# Patient Record
Sex: FEMALE | Race: WHITE | RURAL
[De-identification: names, ages, dates, MRNs, and addresses within clinical notes are randomized per-mention and may not be internally consistent; named-entity substitution may affect disease eponyms.]

---

## 2019-07-16 ENCOUNTER — OFFICE VISIT (OUTPATIENT)
Dept: INTERNAL MEDICINE CLINIC | Age: 24
End: 2019-07-16

## 2019-07-16 VITALS
SYSTOLIC BLOOD PRESSURE: 111 MMHG | RESPIRATION RATE: 16 BRPM | HEIGHT: 65 IN | WEIGHT: 109 LBS | DIASTOLIC BLOOD PRESSURE: 71 MMHG | HEART RATE: 75 BPM | BODY MASS INDEX: 18.16 KG/M2 | OXYGEN SATURATION: 97 % | TEMPERATURE: 98.2 F

## 2019-07-16 DIAGNOSIS — Z30.011 ENCOUNTER FOR INITIAL PRESCRIPTION OF CONTRACEPTIVE PILLS: ICD-10-CM

## 2019-07-16 DIAGNOSIS — J45.20 MILD INTERMITTENT ASTHMA WITHOUT COMPLICATION: Primary | ICD-10-CM

## 2019-07-16 DIAGNOSIS — Z30.09 FAMILY PLANNING: ICD-10-CM

## 2019-07-16 DIAGNOSIS — Z00.00 WELL WOMAN EXAM (NO GYNECOLOGICAL EXAM): ICD-10-CM

## 2019-07-16 DIAGNOSIS — Z23 ENCOUNTER FOR IMMUNIZATION: ICD-10-CM

## 2019-07-16 DIAGNOSIS — Z02.0 SCHOOL PHYSICAL EXAM: ICD-10-CM

## 2019-07-16 LAB
HCG URINE, QL. (POC): NEGATIVE
VALID INTERNAL CONTROL?: YES

## 2019-07-16 RX ORDER — ALBUTEROL SULFATE 90 UG/1
1 AEROSOL, METERED RESPIRATORY (INHALATION)
Qty: 1 INHALER | Refills: 2 | Status: SHIPPED | OUTPATIENT
Start: 2019-07-16 | End: 2022-01-13 | Stop reason: SDUPTHER

## 2019-07-16 RX ORDER — NORGESTIMATE AND ETHINYL ESTRADIOL 0.25-0.035
1 KIT ORAL DAILY
Qty: 1 PACKAGE | Refills: 11 | Status: SHIPPED | OUTPATIENT
Start: 2019-07-16 | End: 2020-07-01 | Stop reason: SDUPTHER

## 2019-07-16 NOTE — PROGRESS NOTES
Chief Complaint   Patient presents with   [address]     I have reviewed the patient's medical history in detail and updated the computerized patient record. Health Maintenance reviewed. Pt stated he/she does have an Advance Directive    Fall Risk Assessment, last 12 mths 7/16/2019   Able to walk? Yes   Fall in past 12 months? No       3 most recent PHQ Screens 7/16/2019   Little interest or pleasure in doing things Several days   Feeling down, depressed, irritable, or hopeless Several days   Total Score PHQ 2 2       Abuse Screening Questionnaire 7/16/2019   Do you ever feel afraid of your partner? N   Are you in a relationship with someone who physically or mentally threatens you? N   Is it safe for you to go home?  Y       ADL Assessment 7/16/2019   Feeding yourself No Help Needed   Getting from bed to chair No Help Needed   Getting dressed No Help Needed   Bathing or showering No Help Needed   Walk across the room (includes cane/walker) No Help Needed   Using the telphone No Help Needed   Taking your medications No Help Needed   Preparing meals No Help Needed   Managing money (expenses/bills) No Help Needed   Moderately strenuous housework (laundry) No Help Needed   Shopping for personal items (toiletries/medicines) No Help Needed   Shopping for groceries No Help Needed   Driving No Help Needed   Climbing a flight of stairs No Help Needed   Getting to places beyond walking distances No Help Needed

## 2019-07-16 NOTE — PATIENT INSTRUCTIONS
Learning About Birth Control What is birth control? Birth control is any method used to prevent pregnancy. Another word for birth control is contraception. If you have sex without birth control, there is a chance that you could get pregnant. This is true even if you have not started having periods yet or you are getting close to menopause. The only sure way to prevent pregnancy is to not have sex. But finding a good method of birth control that you are comfortable with can help you avoid an unplanned pregnancy. Be sure to tell your doctor about any health problems you have or medicines you take. He or she can help you choose the birth control method that is right for you. What are the types of birth control? There are many different kinds of birth control. Each has pros and cons. Learning about all the methods will help you find one that is right for you. · Long-acting reversible contraception (LARC) is the best reversible method you can use to prevent pregnancy. If you decide you want to get pregnant, you can have them removed. LARCs are implants and intrauterine devices (IUDs). While they are being used, they usually prevent pregnancy for years. ? Implants are placed under the skin of the arm. They release the hormone progestin and prevent pregnancy for about 3 years. ? IUDs are placed in the uterus by a doctor. There are two main types of IUDsthe copper IUD and the hormonal IUD. The hormonal IUD releases progestin. IUDs prevent pregnancy for 3 to 10 years, depending on the type. · Hormonal methods are very good at preventing pregnancy. Combination birth control pills (\"the pill\"), skin patches, and vaginal rings release the hormones estrogen and progestin. Shots, mini-pills, hormonal IUDs, and implants release progestin only. · Barrier methods generally do not prevent pregnancy as well as IUDs or hormonal methods do.  Barrier methods include condoms, diaphragms, cervical caps, and sponges. You must use barrier methods every time you have sex. · Natural family planning can work if you and your partner are very careful and you have a regular ovulation cycle. You will need to keep good records so you know when you are most likely to become pregnant (you are fertile). And during times you are fertile, you will need to not have sex or to use a barrier method. Natural family planning is also known as fertility awareness and the rhythm method. · Permanent birth control (sterilization) gives you lasting protection against pregnancy. A man can have a vasectomy, or a woman can have her tubes tied (tubal ligation) or blocked (tubal implant). But this is only a good choice if you are sure that you don't want any (or any more) children. · Emergency contraception, such as the morning-after pill (Plan B), is a backup method to prevent pregnancy if you forget to use birth control or a condom breaks. You can use emergency contraception for up to 5 days after having had sex, but it works best if you take it right away. It is a good idea to keep emergency birth control on hand as backup protection. You can get emergency contraception without a prescription at most drugstores. How do you choose the best method? The best method of birth control is one that protects you every time you have sex. This usually depends on how well you use it. To find a method that will work best for you, think about: 
· How well it works. Think about how important it is to you to avoid pregnancy. Then look at how well each method works. For example, if you plan to have a child soon anyway, you may not need a very reliable method. If you don't want children but feel it is wrong to end a pregnancy, choose a type of birth control that works very well. · How much effort it takes. For example, birth control pills may not be a good choice if you often forget to take medicine.  Or, if you are not sure you will stop and use a barrier method each time you have sex, pick another method. · How much the method costs. For example, condoms are cheap or free in some clinics. Some insurance companies cover the cost of prescription birth control. But cost can sometimes be misleading. An IUD costs a lot up front. But it works for years, making it low-cost over time. · Whether it protects you from infection. Latex condoms can help protect you from sexually transmitted infections (STIs), such as herpes or HIV/AIDS. But they are not the best way to prevent pregnancy. To avoid both STIs and pregnancy, use condoms along with another type of birth control. · Whether you've had a problem with one kind of birth control. Finding the best method of birth control may involve trying something different. Also, you may need to change a method that once worked well for you. · Whether you want children. If you are positive you don't want children, a lasting method of birth control might be best. 
· Your health issues. Some birth control methods may not be safe for you, depending on your health issues. For example, women who smoke, are breastfeeding, or have had breast cancer may not be able to use certain methods. How can you get birth control? · You can buy: 
? Condoms, sponges, and spermicides without a prescription in drugstores, online, and in many grocery stores. ? Emergency contraception without a prescription at most drugstores. · You need to see a doctor to: 
? Get a prescription for birth control pills and other methods that use hormones. ? Have an IUD or implant inserted. ? Be fitted for a diaphragm or cervical cap. Where can you learn more? Go to http://petey-estefani.info/. Enter Q841 in the search box to learn more about \"Learning About Birth Control. \" Current as of: September 5, 2018 Content Version: 11.9 © 9812-1700 [employer].  Care instructions adapted under license by [address] (which disclaims liability or warranty for this information). If you have questions about a medical condition or this instruction, always ask your healthcare professional. [address] any warranty or liability for your use of this information. Bronchodilator, Short-Acting, for Children: Care Instructions Your Care Instructions Bronchodilators are medicines that make it easier to breathe. They relax the airways of the lungs. Short-acting bronchodilators work fast. They treat sudden breathing problems, like asthma attacks or wheezing. They aren't the same as long-acting bronchodilators. These are used every day to control asthma. These short-acting medicines are often inhaled. They also come in the form of pills or liquids. Follow-up care is a key part of your child's treatment and safety. Be sure to make and go to all appointments, and call your doctor if your child is having problems. It's also a good idea to know your child's test results and keep a list of the medicines your child takes. How can you care for your child at home? · Have your child take medicines exactly as prescribed. Call your doctor if you think your child is having a problem with his or her medicine. · If your child uses an inhaler and spacer, talk with your doctor to be sure that you know how to use them the right way. Be sure your child uses them exactly as your doctor has prescribed. · Try not to give your child an inhaled medicine when he or she is crying. When your child is crying, not as much medicine goes to the lungs. · Pay attention to how often your child needs to use this medicine. Does your child need to use it on more than 2 days a week (except before exercise)? If so, your doctor may need to change the amount and number of controller medicines your child takes. · Let your doctor know if your child has side effects from the medicine. These may include: ? A fast heartbeat. ? Headache and dizziness. ? Nausea, vomiting, and diarrhea. ? Anxiety. ? Hives and skin rash. ? Nervousness or tremor (such as unsteady, shaky hands). When should you call for help? Call 911 anytime you think your child may need emergency care. For example, call if: 
  · Your child has severe trouble breathing. Signs may include the chest sinking in, using belly muscles to breathe, or nostrils flaring while your child is struggling to breathe.  
 Call your doctor now or seek immediate medical care if: 
  · Your child has an asthma or wheezing attack and the medicine doesn't help.  
  · Your child coughs up yellow, dark brown, or bloody mucus (sputum).  
 Watch closely for changes in your child's health, and be sure to contact your doctor if: 
  · Your child's wheezing and coughing get worse.  
  · Your child needs quick-relief medicine on more than 2 days a week (unless it is just for exercise).  
  · Your child has any new symptoms, such as a fever. Where can you learn more? Go to http://petey-estefani.info/. Enter A654 in the search box to learn more about \"Bronchodilator, Short-Acting, for Children: Care Instructions. \" Current as of: September 5, 2018 Content Version: 11.9 © 0878-9588 [employer]. Care instructions adapted under license by [employer] (which disclaims liability or warranty for this information). If you have questions about a medical condition or this instruction, always ask your healthcare professional. [place] [address] any warranty or liability for your use of this information. Shot for [employer] Control: Care Instructions Your Care Instructions The shot is used to prevent pregnancy. You get the shot in your upper arm or rear end (buttocks). The shot gives you a dose of the hormone progestin. The shot is often called by its brand name, Depo-Provera. The shot provides birth control for 3 months at a time. You then need another shot. Follow-up care is a key part of your treatment and safety. Be sure to make and go to all appointments, and call your doctor if you are having problems. It's also a good idea to know your test results and keep a list of the medicines you take. How can you care for yourself at home? How do you use the birth control shot? · If you get the shot during the first 5 days of your normal period, use backup birth control, such as a condom, or don't have intercourse for 24 hours. · If you get the shot more than 5 days after your periods starts, use backup birth control or don't have intercourse for 5 days. · Talk to your doctor about a schedule to get the shot. You need the shot every 3 months. If you are late getting it, you'll need backup birth control. What if you miss or are late for a shot? Always read the label for specific instructions, or call your doctor. Here are some basic guidelines: · Use backup birth control, such as a condom, or don't have intercourse. Continue using one of these methods until 5 days after you get the missed or late shot. · If you had intercourse, you can use emergency contraception, such as the morning-after pill (Plan B). You can use emergency contraception for up to 5 days after having had sex, but it works best if you take it right away. What else do you need to know? · The shot has side effects. Because the shot protects for 3 months, the side effects may last 3 months. ? You may have changes in your period and your period may stop. You may also have spotting or bleeding between periods. ? You may have mood changes, less interest in sex, or weight gain. · The shot may cause bone loss. Talk to your doctor about the risks and benefits of using the shot. · Check with your doctor before you use any other medicines, including over-the-counter medicines, vitamins, herbal products, and supplements. Birth control hormones may not work as well to prevent pregnancy when combined with other medicines. · The shot doesn't protect against sexually transmitted infections (STIs), such as herpes or HIV/AIDS. If you're not sure whether your sex partner might have an STI, use a condom to protect against infection. When should you call for help? Watch closely for changes in your health, and be sure to contact your doctor if you have any problems. Where can you learn more? Go to http://petey-estefani.info/. Enter A573 in the search box to learn more about \"Shot for Birth Control: Care Instructions. \" Current as of: September 5, 2018 Content Version: 11.9 © 5838-0044 [employer]. Care instructions adapted under license by [employer] (which disclaims liability or warranty for this information). If you have questions about a medical condition or this instruction, always ask your healthcare professional. [address] any warranty or liability for your use of this information. Vaccine Information Statement Varicella (Chickenpox) Vaccine: What You Need to Know Many Vaccine Information Statements are available in [place] and other languages. See www.immunize.org/vis Hojas de información sobre vacunas están disponibles en español y en muchos otros idiomas. Visite www.immunize.org/vis 1. Why get vaccinated? Varicella (also called chickenpox) is a very contagious viral disease. It is caused by the varicella zoster virus. Chickenpox is usually mild, but it can be serious in infants under 15months of age, adolescents, adults, pregnant women, and people with weakened immune systems. Chickenpox causes an itchy rash that usually lasts about a week. It can also cause: 
 fever  tiredness  loss of appetite  headache More serious complications can include: 
 skin infections  infection of the lungs (pneumonia)  inflammation of blood vessels  swelling of the brain and/or spinal cord coverings (encephalitis or meningitis)  blood stream, bone, or joint infections Some people get so sick that they need to be hospitalized. It doesnt happen often, but people can die from chickenpox. Before varicella vaccine, almost everyone in the [place] got chickenpox, an average of 4 million people each year. Children who get chickenpox usually miss at least 5 or 6 days of school or childcare. Some people who get chickenpox get a painful rash called shingles (also known as herpes zoster) years later. Chickenpox can spread easily from an infected person to anyone who has not had chickenpox and has not gotten chickenpox vaccine. 2. Chickenpox vaccine Children 12 months through 15years of age should get 2 doses of chickenpox vaccine, usually:  First dose: 12 through 13months of age  Second dose: 4 through 10years of age People 15years of age or older who didnt get the vaccine when they were younger, and have never had chickenpox, should get 2 doses at least 28 days apart. A person who previously received only one dose of chickenpox vaccine should receive a second dose to complete the series. The second dose should be given at least 3 months after the first dose for those younger than 13 years, and at least 28 days after the first dose for those 15years of age or older. There are no known risks to getting chickenpox vaccine at the same time as other vaccines. 3. Some people should not get this vaccine Tell your vaccine provider if the person getting the vaccine: 
 
 Has any severe, life-threatening allergies. A person who has ever had a life-threatening allergic reaction after a dose of chickenpox vaccine, or has a severe allergy to any part of this vaccine, may be advised not to be vaccinated. Ask your health care provider if you want information about vaccine components.  Is pregnant, or thinks she might be pregnant. Pregnant women should wait to get chickenpox vaccine until after they are no longer pregnant. Women should avoid getting pregnant for at least 1 month after getting chickenpox vaccine.  Has a weakened immune system due to disease (such as cancer or HIV/AIDS) or medical treatments (such as radiation, immunotherapy, steroids, or chemotherapy).  Has a parent, brother, or sister with a history of immune system problems.  Is taking salicylates (such as aspirin). People should avoid using salicylates for 6 weeks after getting varicella vaccine.  Has recently had a blood transfusion or received other blood products. You might be advised to postpone chickenpox vaccination for 3 months or more.  Has tuberculosis.  Has gotten any other vaccines in the past 4 weeks. Live vaccines given too close together might not work as well.  Is not feeling well. A mild illness, such as a cold, is usually not a reason to postpone a vaccination. Someone who is moderately or severely ill should probably wait. Your doctor can advise you. 4. Risks of a vaccine reaction With any medicine, including vaccines, there is a chance of reactions. These are usually mild and go away on their own, but serious reactions are also possible. Getting chickenpox vaccine is much safer than getting chickenpox disease. Most people who get chickenpox vaccine do not have any problems with it. After chickenpox vaccination, a person might experience: 
 
Minor events:  Sore arm from the injection  Fever  Redness or rash at the injection site If these events happen, they usually begin within 2 weeks after the shot. They occur less often after the second dose. More serious events following chickenpox vaccination are rare. They can include: 
 Seizure (jerking or staring) often associated with fever  Infection of the lungs (pneumonia) or the brain and spinal cord coverings (meningitis)  Rash all over the body A person who develops a rash after chickenpox vaccination might be able to spread the varicella vaccine virus to an unprotected person. Even though this happens very rarely, anyone who gets a rash should stay away from people with weakened immune systems and unvaccinated infants until the rash goes away. Talk with your health care provider to learn more. Other things that could happen after this vaccine:  People sometimes faint after medical procedures, including vaccination. Sitting or lying down for about 15 minutes can help prevent fainting and injuries caused by a fall. Tell your doctor if you feel dizzy or have vision changes or ringing in the ears.  Some people get shoulder pain that can be more severe and longer-lasting than routine soreness that can follow injections. This happens very rarely.  Any medication can cause a severe allergic reaction. Such reactions to a vaccine are estimated at about 1 in a million doses, and would happen within a few minutes to a few hours after the vaccination. As with any medicine, there is a very remote chance of a vaccine causing a serious injury or death. The safety of vaccines is always being monitored. For more information, visit: www.cdc.gov/vaccinesafety/ 
 
5. What if there is a serious problem? What should I look for?  Look for anything that concerns you, such as signs of a severe allergic reaction, very high fever, or unusual behavior. Signs of a severe allergic reaction can include hives, swelling of the face and throat, difficulty breathing, a fast heartbeat, dizziness, and weakness. These would usually start a few minutes to a few hours after the vaccination. What should I do?  If you think it is a severe allergic reaction or other emergency that cant wait, call 9-1-1 and get to the nearest hospital. Otherwise, call your health care provider. Afterward, the reaction should be reported to the Vaccine Adverse Event Reporting System (VAERS). Your doctor should file this report, or you can do it yourself through the VAERS web site at www.vaers. [facility].gov, or by calling 9-445.495.1536. VAERS does not give medical advice. 6. The National Vaccine Injury Compensation Program 
 
The [facility] Vaccine Injury Compensation Program (VICP) is a federal program that was created to compensate people who may have been injured by certain vaccines. Persons who believe they may have been injured by a vaccine can learn about the program and about filing a claim by calling 1-984.113.9961 or visiting the [email or web address] website at www.[facility].gov/vaccinecompensation. There is a time limit to file a claim for compensation. 7. How can I learn more?  Ask your health care provider. He or she can give you the vaccine package insert or suggest other sources of information.  Call your local or state health department.  Contact the Centers for Disease Control and Prevention (CDC): 
- Call 0-913.110.7704 (1-800-CDC-INFO) or 
- Visit CDCs website at www.cdc.gov/vaccines Vaccine Information Statement Varicella Vaccine 2/12/2018 
[address] [id number] Department of Health and [place] Centers for Disease Control and Prevention Office Use Only

## 2019-07-16 NOTE — PROGRESS NOTES
Subjective:   27 y.o. female for Well Woman Check. Her gyne and breast care is done elsewhere by her Ob-Gyne physician. Here with , going to start nursing school. Patient is new to this clinic. Comes in to establish care. Previous care was with  Reason for the change is: check up no reg PCP  Last pap with preg.   No hospiyalizations. Patient Active Problem List    Diagnosis Date Noted    Mild intermittent asthma 2019     Allergies   Allergen Reactions    Amoxicillin Rash     Rash, itching    Cefzil [Cefprozil] Rash     Rash, itching    Pcn [Penicillins] Rash     Rash, itching     Past Medical History:   Diagnosis Date    Asthma      No past surgical history on file. No family history on file. Social History     Tobacco Use    Smoking status: Former Smoker    Smokeless tobacco: Never Used   Substance Use Topics    Alcohol use: Not Currently             ROS: Feeling generally well. No TIA's or unusual headaches, no dysphagia. No prolonged cough. No dyspnea or chest pain on exertion. No abdominal pain, change in bowel habits, black or bloody stools. No urinary tract symptoms. No new or unusual musculoskeletal symptoms. Specific co, family planning    Objective: The patient appears well, alert, oriented x 3, in no distress. Visit Vitals  /71 (BP 1 Location: Left arm, BP Patient Position: Sitting)   Pulse 75   Temp 98.2 °F (36.8 °C) (Oral)   Resp 16   Ht 5' 4.74\" (1.644 m)   Wt 109 lb (49.4 kg)   SpO2 97%   BMI 18.28 kg/m²     ENT normal.  Neck supple. No adenopathy or thyromegaly. GERMAN. Lungs are clear, good air entry, no wheezes, rhonchi or rales. S1 and S2 normal, no murmurs, regular rate and rhythm. Abdomen soft without tenderness, guarding, mass or organomegaly. Extremities show no edema, normal peripheral pulses. Neurological is normal, no focal findings. Breast and Pelvic exams are deferred.     Assessment/Plan:   Well Woman  call if any problems  Encounter Diagnoses   Name Primary?  Mild intermittent asthma without complication Yes    Family planning     Well woman exam (no gynecological exam)     Comment: [V70.0]    Encounter for initial prescription of contraceptive pills     Encounter for immunization     School physical exam      Orders Placed This Encounter    IA IMMUNIZ ADMIN,1 SINGLE/COMB VAC/TOXOID    Varicella virus vaccine, live, subcut    AMB POC URINE PREGNANCY TEST, VISUAL COLOR COMPARISON    AMB POC TUBERCULOSIS, INTRADERMAL (SKIN TEST)    albuterol (PROVENTIL HFA, VENTOLIN HFA, PROAIR HFA) 90 mcg/actuation inhaler    norgestimate-ethinyl estradiol (ORTHO-CYCLEN, SPRINTEC) 0.25-35 mg-mcg tab    DISCONTD: varicella virus vaccine, live, (VARIVAX, PF,) 1,350 unit/0.5 mL injection     Discussed possible side affects, precautions, and drug interactions and possible benefits of the medication(s). See patient instructions, went over them personally with the patient. Emphasized compliance. Questions answered. Patient states that they understand the plan of action and will call if there are any issues or misunderstandings. Should consider IUD but start OCP in the mean time. Follow-up and Dispositions    · Return in about 3 months (around 10/16/2019) for routine follow up.

## 2019-07-16 NOTE — PROGRESS NOTES
Dk Parikh is a 27 y.o. female who presents for routine immunizations - 0.5cc Varicella Vaccine given IM SC to left arm per order of Cindy Bell MD - She denies any symptoms , reactions or allergies that would exclude them from being immunized today - Risks and adverse reactions were discussed and the VIS was given to them. All questions were addressed - She was observed for 10 min post injection.  There were no reactions observed - Ramirez Baker, SHAWN [id number]

## 2019-07-18 PROBLEM — N94.6 DYSMENORRHEA: Status: ACTIVE | Noted: 2019-07-18

## 2019-07-19 ENCOUNTER — CLINICAL SUPPORT (OUTPATIENT)
Dept: INTERNAL MEDICINE CLINIC | Age: 24
End: 2019-07-19

## 2019-07-19 DIAGNOSIS — Z11.1 ENCOUNTER FOR PPD SKIN TEST READING: Primary | ICD-10-CM

## 2019-07-19 LAB
MM INDURATION POC: 0 MM (ref 0–5)
PPD POC: NEGATIVE NEGATIVE

## 2019-07-19 NOTE — PROGRESS NOTES
PPD Reading Note @ 0158  PPD read from left forearm and results entered in [facility]. Result: 0 mm induration.   Interpretation: Negative  Allergic reaction: no

## 2019-07-30 ENCOUNTER — CLINICAL SUPPORT (OUTPATIENT)
Dept: INTERNAL MEDICINE CLINIC | Age: 24
End: 2019-07-30

## 2019-07-30 VITALS
TEMPERATURE: 98.3 F | OXYGEN SATURATION: 98 % | BODY MASS INDEX: 18.28 KG/M2 | HEART RATE: 76 BPM | HEIGHT: 65 IN | SYSTOLIC BLOOD PRESSURE: 94 MMHG | DIASTOLIC BLOOD PRESSURE: 58 MMHG | RESPIRATION RATE: 14 BRPM

## 2019-07-30 DIAGNOSIS — Z11.1 ENCOUNTER FOR PPD SKIN TEST READING: Primary | ICD-10-CM

## 2019-07-30 NOTE — PROGRESS NOTES
PPD Placement note  Hakeem Sood, 27 y.o. 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 Yes  Is patient taking any oral or IV steroid medication now or have they taken it in the last month? no  Has the patient ever received the BCG vaccine?: no  Has the patient been in recent contact with anyone known or suspected of having active TB disease?: no       Date of exposure (if applicable): n/a       Name of person they were exposed to (if applicable): n/a  O: Alert and oriented in NAD. P:  PPD placed on 7/30/2019 @ 0930. Patient advised to return for reading within 48-72 hours. Tuberculin skin test applied to left ventral forearm.

## 2019-08-02 ENCOUNTER — CLINICAL SUPPORT (OUTPATIENT)
Dept: INTERNAL MEDICINE CLINIC | Age: 24
End: 2019-08-02

## 2019-08-02 DIAGNOSIS — Z11.1 SCREENING FOR TUBERCULOSIS: Primary | ICD-10-CM

## 2019-08-02 NOTE — PROGRESS NOTES
Patient in for reading of 2nd PPD - negative with 0mm duration - form completed and copies and patient took original one with her  Brayden Srivastava LPN  5/8/0212  9:09 PM

## 2019-09-17 LAB
MM INDURATION POC: 0 MM (ref 0–5)
PPD POC: NEGATIVE NEGATIVE

## 2019-11-08 ENCOUNTER — OFFICE VISIT (OUTPATIENT)
Dept: INTERNAL MEDICINE CLINIC | Age: 24
End: 2019-11-08

## 2019-11-08 VITALS
RESPIRATION RATE: 16 BRPM | OXYGEN SATURATION: 100 % | TEMPERATURE: 97.8 F | BODY MASS INDEX: 17.83 KG/M2 | SYSTOLIC BLOOD PRESSURE: 131 MMHG | DIASTOLIC BLOOD PRESSURE: 76 MMHG | WEIGHT: 107 LBS | HEIGHT: 65 IN | HEART RATE: 83 BPM

## 2019-11-08 DIAGNOSIS — Z30.09 FAMILY PLANNING: ICD-10-CM

## 2019-11-08 DIAGNOSIS — F32.2 CURRENT SEVERE EPISODE OF MAJOR DEPRESSIVE DISORDER WITHOUT PSYCHOTIC FEATURES WITHOUT PRIOR EPISODE (HCC): Primary | ICD-10-CM

## 2019-11-08 DIAGNOSIS — F41.0 PANIC DISORDER: ICD-10-CM

## 2019-11-08 RX ORDER — CITALOPRAM 10 MG/1
10 TABLET ORAL DAILY
Qty: 30 TAB | Refills: 2 | Status: SHIPPED | OUTPATIENT
Start: 2019-11-08 | End: 2020-01-08

## 2019-11-08 NOTE — PROGRESS NOTES
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 Health Maintenance reviewed. 1. Have you been to the ER, urgent care clinic since your last visit? Hospitalized since your last visit?no    2. Have you seen or consulted any other health care providers outside of the [address] [place] [address] since your last visit? Include any pap smears or colon screening. No      Encouraged pt to discuss pt's wishes with spouse/partner/family and bring them in the next appt to follow thru with the Advanced Directive    Fall Risk Assessment, last 12 mths 7/16/2019   Able to walk? Yes   Fall in past 12 months? No       3 most recent PHQ Screens 11/8/2019   Little interest or pleasure in doing things Nearly every day   Feeling down, depressed, irritable, or hopeless Nearly every day   Total Score PHQ 2 6       Abuse Screening Questionnaire 7/16/2019   Do you ever feel afraid of your partner? N   Are you in a relationship with someone who physically or mentally threatens you? N   Is it safe for you to go home?  Y       ADL Assessment 7/16/2019   Feeding yourself No Help Needed   Getting from bed to chair No Help Needed   Getting dressed No Help Needed   Bathing or showering No Help Needed   Walk across the room (includes cane/walker) No Help Needed   Using the telphone No Help Needed   Taking your medications No Help Needed   Preparing meals No Help Needed   Managing money (expenses/bills) No Help Needed   Moderately strenuous housework (laundry) No Help Needed   Shopping for personal items (toiletries/medicines) No Help Needed   Shopping for groceries No Help Needed   Driving No Help Needed   Climbing a flight of stairs No Help Needed   Getting to places beyond walking distances No Help Needed

## 2019-11-08 NOTE — PATIENT INSTRUCTIONS
Teens Recovering From Depression: Care Instructions Your Care Instructions Taking good care of yourself is important as you recover from depression. In time, your symptoms will fade as your treatment takes hold. Do not give up. Instead, focus your energy on getting better. Your mood will improve. It just takes some time. Focus on things that can help you feel better, such as being with friends and family, eating well, and getting enough rest. But take things slowly. Do not do too much too soon. You will begin to feel better gradually. Follow-up care is a key part of your treatment and safety. Be sure to make and go to all appointments, and call your doctor if you are having problems. It's also a good idea to know your test results and keep a list of the medicines you take. How can you care for yourself at home? Be realistic · If you have a large task to do, break it up into smaller steps you can handle, and just do what you can. · Think about putting off important decisions until your depression has lifted. If you have plans that will have a major impact on your life, such as dropping out of school or choosing a college, try to wait a bit. Talk it over with friends and family who can help you look at the overall picture. · Reach out to people for help. Do not isolate yourself. Let your family and friends help you. Find people you can trust and confide in, and talk to them. · Be patient, and be kind to yourself. Remember that depression is not your fault and is not something you can overcome with willpower alone. Treatment is necessary for depression, just like for any other illness. Feeling better takes time, and your mood will improve little by little. Stay active · Stay busy and get outside. Join a school club or take part in school activities. Become a volunteer. · Get plenty of exercise every day.  Go for a walk or jog, ride your bike, or play sports with friends. Talk with your doctor about an exercise program. Exercise can help with mild depression. · Go to a movie or concert. Take part in a Buddhist activity or other social gathering. Go to a sports event. · Ask a friend to do things with you. You could play a computer game, go shopping, or listen to music, for example. Follow your treatment plan · If your doctor prescribed medicine, take it exactly as prescribed. Call your doctor if you think you are having a problem with your medicine. ? You may start to feel better within 1 to 3 weeks of taking antidepressant medicine. But it can take as many as 6 to 8 weeks to see more improvement. ? If you do not notice any improvement in 3 weeks, talk to your doctor. ? Antidepressants can make you feel tired, dizzy, or nervous. Some people have dry mouth, constipation, headaches, or diarrhea. Many of these side effects are mild and will go away on their own after you have been taking the medicine for a few weeks. Some may last longer. Talk to your doctor if side effects are bothering you too much. You might be able to try a different medicine. · Do not take medicines that have not been prescribed for you. They may interfere with medicines you may be taking for depression, or they may make your depression worse. · If you have a counselor, go to all your appointments. · Keep the numbers for these national suicide hotlines: 6-299-192-TALK (3-788.492.5561) and 1-359-UJJEGVA (8-175.194.6407). If you or someone you know talks about suicide or feeling hopeless, get help right away. Take care of yourself · Eat a balanced diet with plenty of fresh fruits and vegetables, whole grains, and lean protein. If you have lost your appetite, eat small snacks rather than large meals. · Do not drink alcohol or use illegal drugs. · Get enough sleep.  If you have problems sleeping: 
? Go to bed at the same time every night, and get up at the same time every morning. ? Keep your bedroom dark and quiet. ? Do not exercise after 5:00 p.m. ? Avoid drinks with caffeine after 5:00 p.m. · Avoid sleeping pills unless they are prescribed by the doctor treating your depression. Sleeping pills may make you groggy during the day, and they may interact with other medicine you are taking. · If you have any other illnesses, such as diabetes, make sure to continue with your treatment. Tell your doctor about all of the medicines you take, including those with or without a prescription. When should you call for help? Call 911 anytime you think you may need emergency care. For example, call if: 
  · You are thinking about suicide or are threatening suicide.  
  · You feel you cannot stop from hurting yourself or someone else.  
  · You hear or see things that aren't real.  
  · You think or speak in a bizarre way that is not like your usual behavior.  
 Call your doctor now or seek immediate medical care if: 
  · You are drinking a lot of alcohol or using illegal drugs.  
  · You are talking or writing about death.  
 Watch closely for changes in your health, and be sure to contact your doctor if: 
  · You find it hard or it's getting harder to deal with school, a job, family, or friends.  
  · You think your treatment is not helping or you are not getting better.  
  · Your symptoms get worse or you get new symptoms.  
  · You have any problems with your antidepressant medicines, such as side effects, or you are thinking about stopping your medicine.  
  · You are having manic behavior, such as having very high energy, needing less sleep than normal, or showing risky behavior such as spending money you don't have or abusing others verbally or physically. Where can you learn more? Go to http://petey-estefani.info/. Enter L325 in the search box to learn more about \"Teens Recovering From Depression: Care Instructions. \" Current as of: May 28, 2019 Content Version: 12.2 © 2030-6689 [employer]. Care instructions adapted under license by [employer] (which disclaims liability or warranty for this information). If you have questions about a medical condition or this instruction, always ask your healthcare professional. [address] any warranty or liability for your use of this information. Depression Treatment in Teens: Care Instructions Your Care Instructions Depression is a disease that affects the way you feel, think, and act. It causes symptoms such as low energy, loss of interest in daily activities, and sadness or grouchiness that goes on for a long time. You may sleep a lot or move or speak more slowly than usual. Teens with severe depression may see or hear things that aren't there (hallucinations) or believe things that aren't true (delusions). Don't feel embarrassed or ashamed about depression. Depression is caused by changes in the natural chemicals in your brain. Depression is not a character flaw, and it does not mean that you are a bad or weak person. It does not mean that you are going crazy. You can get over depression. You don't have to feel bad. Medicines, counseling, and self-care can all help. Follow-up care is a key part of your treatment and safety. Be sure to make and go to all appointments, and call your doctor if you are having problems. It's also a good idea to know your test results and keep a list of the medicines you take. How can you care for yourself at home? Counseling · Learn about counseling. It may be all you need if you have mild depression. Counseling deals with how you think about things and how you act each day. · Find counseling that works for you. You and your counselor may work together, or you may have group counseling. Family counseling also may be helpful. · Find a counselor you can feel at ease with and trust. 
Antidepressant medicines · If the doctor prescribed antidepressant medicines, take them exactly as prescribed. Don't stop taking them without talking to your doctor. Antidepressants may need time to work. If you stop taking them too soon, your symptoms may come back or get worse. · Learn about antidepressant medicines. They can improve or end the symptoms of depression. ? You may start to feel better after 1 to 3 weeks of taking the medicine. But it can take as many as 6 to 8 weeks to see more improvement. You will have to take the medicine for at least 6 months, and often longer. · Work with your doctor to find the best antidepressant for you. You may have to try different antidepressants before you find one that works. If you have concerns about the medicine, or if you don't feel better in 3 weeks, talk to your doctor. · Watch for side effects. The medicines can make you feel tired, dizzy, or nervous. Many side effects are mild and go away on their own after a few weeks. Talk to your doctor if side effects bother you too much. · Don't suddenly stop taking antidepressants. Stopping suddenly could be dangerous. Your doctor can help you slowly reduce the dose to prevent problems. To help manage depression · Talk to your doctor, counselor, or another adult right away if you have thoughts of hurting yourself or others. Sometimes people with depression have these thoughts. · Keep the numbers for these national suicide hotlines: 4-832-721-TALK (5-898.770.9520) and 9-268-YOTGATT (6-790.347.8576). If you or someone you know talks about suicide or feeling hopeless, get help right away. · If you are taking medicine, take it exactly as prescribed. Call your doctor if you think you are having a problem with your medicine. · If you have a counselor, go to all your appointments. · Get support from others. ? Your family can help you get the right treatment and deal with your symptoms. ? Social support and support groups give you the chance to talk with teens who are going through the same things you are. · Plan something pleasant for yourself every day. Include activities that you have enjoyed in the past. 
· Spend time with family and friends. It may help to speak openly about your depression with people you trust. 
· Think about putting off big decisions until your depression has lifted. For example, wait a bit on making decisions about dropping out of school or choosing a college. Talk it over with friends and family who can help you look at the whole picture. · Think positively. Challenge negative thoughts with statements such as \"I am hopeful,\" \"Things will get better,\" and \"I can ask for the help I need. \" Write down these statements and read them often, even if you don't believe them yet. · Be patient with yourself. It took time for your depression to develop, and it will take time for your symptoms to improve. Do not take on too much or be too hard on yourself. To stay healthy · Get plenty of exercise every day. Go for a walk or jog, ride your bike, or play sports with friends. · Get enough sleep. A good night's sleep can help mood and stress levels. Avoid sleeping pills unless your doctor prescribes them. · Eat a balanced diet. This helps your body deal with tension and stress. Whole grains, dairy products, fruits, vegetables, and protein are part of a balanced diet. If you do not feel hungry, eat small snacks rather than large meals. · Do not drink alcohol, use illegal drugs, or take medicines that your doctor has not prescribed for you. They may interfere with your treatment. When should you call for help? Call 911 anytime you think you may need emergency care.  For example, call if: 
  · You are thinking about suicide or are threatening suicide.  
  · You feel you cannot stop from hurting yourself or someone else.  
  · You hear or see things that aren't real.  
   · You think or speak in a bizarre way that is not like your usual behavior.  
 Call your doctor now or seek immediate medical care if: 
  · You have thoughts of hurting yourself or others.  
  · You are drinking a lot of alcohol or using illegal drugs.  
  · You are talking or writing about death.  
 Watch closely for changes in your health, and be sure to contact your doctor if: 
  · You find it hard or it's getting harder to deal with school, a job, family, or friends.  
  · You think your treatment is not helping or you are not getting better.  
  · Your symptoms get worse or you get new symptoms.  
  · You have any problems with your antidepressant medicines, such as side effects, or you are thinking about stopping your medicine.  
  · You are having manic behavior, such as having very high energy, needing less sleep than normal, or showing risky behavior such as spending money you don't have or abusing others verbally or physically. Where can you learn more? Go to http://petey-estefani.info/. Enter Reford Denilson in the search box to learn more about \"Depression Treatment in Teens: Care Instructions. \" Current as of: May 28, 2019 Content Version: 12.2 © 4021-8943 [employer]. Care instructions adapted under license by [employer] (which disclaims liability or warranty for this information). If you have questions about a medical condition or this instruction, always ask your healthcare professional. [address] any warranty or liability for your use of this information.

## 2019-11-08 NOTE — PROGRESS NOTES
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 2-3 times a week. Doing well with mood no  Symptoms streessed out to the point where staysin room x mo worse x past week. Suicidal: no  Side affects: No  States taking medications per medicine list yes  Trying to go to school take care of her 3 kids. Sometimes her in-laws criticize her.]    Patient Active Problem List    Diagnosis Date Noted    Mild intermittent asthma 07/16/2019     Allergies   Allergen Reactions    Amoxicillin Rash     Rash, itching    Cefzil [Cefprozil] Rash     Rash, itching    Pcn [Penicillins] Rash     Rash, itching     Past Medical History:   Diagnosis Date    Asthma      Social History     Tobacco Use    Smoking status: Former Smoker    Smokeless tobacco: Never Used   Substance Use Topics    Alcohol use: Not Currently        OBJECTIVE:    .  Visit Vitals  /76 (BP 1 Location: Left arm, BP Patient Position: Sitting)   Pulse 83   Temp 97.8 °F (36.6 °C) (Oral)   Resp 16   Ht 5' 4.75\" (1.645 m)   Wt 107 lb (48.5 kg)   SpO2 100%   BMI 17.94 kg/m²     WDWN in NAD occasionally tearful  Heart RRR, no:C/M/R  Lungs CTA No wheezes, rales or rhonchi  Abdo: soft no tenderness, rebound or guarding  Neurological exam[de-identified] 2-12 intact  Psychiatric: Normal mood, judgement    Reviewed: Medications, allergies, clinical lab test results and imaging results have been reviewed. Any abnormal findings have been addressed. ASSESSMENT:       ICD-10-CM ICD-9-CM    1. Current severe episode of major depressive disorder without psychotic features without prior episode ([facility]) F32.2 296.23 citalopram (CELEXA) 10 mg tablet      AMB SUPPLY ORDER   2. Panic disorder F41.0 300.01 citalopram (CELEXA) 10 mg tablet   3.  Family planning Z30.09 V25.09        PLAN    Orders Placed This Encounter    AMB SUPPLY ORDER     Ms Maddi Aburto      citalopram (CELEXA) 10 mg tablet     Sig: Take 1 Tab by mouth daily. Half a tab for 4 days then increase     Dispense:  30 Tab     Refill:  2     Discussed possible side affects, precautions, and drug interactions and possible benefits of the medication(s). Time constraints her ability to see a counselor but I did encourage her to go. Current Outpatient Medications   Medication Sig Dispense Refill    citalopram (CELEXA) 10 mg tablet Take 1 Tab by mouth daily. Half a tab for 4 days then increase 30 Tab 2    albuterol (PROVENTIL HFA, VENTOLIN HFA, PROAIR HFA) 90 mcg/actuation inhaler Take 1 Puff by inhalation every six (6) hours as needed for Wheezing. Indications: Asthma Attack 1 Inhaler 2    norgestimate-ethinyl estradiol (ORTHO-CYCLEN, SPRINTEC) 0.25-35 mg-mcg tab Take 1 Tab by mouth daily. 1 Package 11       Follow-up and Dispositions    · Return in about 4 weeks (around 12/6/2019) for routine follow up.

## 2020-01-08 ENCOUNTER — OFFICE VISIT (OUTPATIENT)
Dept: INTERNAL MEDICINE CLINIC | Age: 25
End: 2020-01-08

## 2020-01-08 VITALS
HEIGHT: 65 IN | BODY MASS INDEX: 17.49 KG/M2 | TEMPERATURE: 97.4 F | DIASTOLIC BLOOD PRESSURE: 62 MMHG | WEIGHT: 105 LBS | SYSTOLIC BLOOD PRESSURE: 92 MMHG | HEART RATE: 76 BPM | OXYGEN SATURATION: 99 % | RESPIRATION RATE: 16 BRPM

## 2020-01-08 DIAGNOSIS — Z63.0 MARITAL PROBLEM: ICD-10-CM

## 2020-01-08 DIAGNOSIS — F41.0 PANIC DISORDER: ICD-10-CM

## 2020-01-08 DIAGNOSIS — F32.2 CURRENT SEVERE EPISODE OF MAJOR DEPRESSIVE DISORDER WITHOUT PSYCHOTIC FEATURES WITHOUT PRIOR EPISODE (HCC): Primary | ICD-10-CM

## 2020-01-08 RX ORDER — CITALOPRAM 20 MG/1
20 TABLET, FILM COATED ORAL DAILY
Qty: 90 TAB | Refills: 1 | Status: SHIPPED | OUTPATIENT
Start: 2020-01-08 | End: 2020-01-10

## 2020-01-08 SDOH — SOCIAL STABILITY - SOCIAL INSECURITY: PROBLEMS IN RELATIONSHIP WITH SPOUSE OR PARTNER: Z63.0

## 2020-01-08 NOTE — PROGRESS NOTES
PROGRESS NOTE        SUBJECTIVE:  Diagnosis/Chief Complaint: Depression (follow up, alot going at home and her anxiety and depression is better)    Not seen therapist. Luis Couch of psych soc issues. Doing well with mood no but a little better  Symptoms sad,  lost job and might be cheating on her. Not discussed with her mother but in laws supportive. Suicidal: no  Side affects: no  States taking medications per medicine list.yes - as RX    Patient Active Problem List    Diagnosis Date Noted    Mild intermittent asthma 07/16/2019     See FHx    Allergies   Allergen Reactions    Amoxicillin Rash     Rash, itching    Cefzil [Cefprozil] Rash     Rash, itching    Pcn [Penicillins] Rash     Rash, itching     Social History     Tobacco Use    Smoking status: Former Smoker    Smokeless tobacco: Never Used   Substance Use Topics    Alcohol use: Not Currently        OBJECTIVE:    .  Visit Vitals  BP 92/62 (BP 1 Location: Left arm, BP Patient Position: Sitting)   Pulse 76   Temp 97.4 °F (36.3 °C) (Oral)   Resp 16   Ht 5' 4.75\" (1.645 m)   Wt 105 lb (47.6 kg)   SpO2 99%   BMI 17.61 kg/m²     WDWN in NAD occa tearful  Heart RRR, no:C/M/R  Lungs CTA No wheezes, rales or rhonchi  Abdo: soft no tenderness, rebound or guarding  Neurological exam[de-identified] 2-12 intact  Psychiatric: Normal mood, judgement    Reviewed: Medications, allergies, clinical lab test results and imaging results have been reviewed. Any abnormal findings have been addressed. ASSESSMENT:       ICD-10-CM ICD-9-CM    1. Current severe episode of major depressive disorder without psychotic features without prior episode ([facility]) F32.2 296.23 citalopram (CELEXA) 20 mg tablet      DISCONTINUED: citalopram (CELEXA) 20 mg tablet   2. Panic disorder F41.0 300.01 citalopram (CELEXA) 20 mg tablet      DISCONTINUED: citalopram (CELEXA) 20 mg tablet   3.  Marital problem Z63.0 V61.10        PLAN    Orders Placed This Encounter    DISCONTD: citalopram (CELEXA) 20 mg tablet Sig: Take 1 Tab by mouth daily. Half a tab for 4 days then increase     Dispense:  90 Tab     Refill:  1    citalopram (CELEXA) 20 mg tablet     Sig: Take 1 Tab by mouth daily. Dispense:  90 Tab     Refill:  1     Needs to get into counseling. get family support, get into marital counseling. Again emphazised and gave her hard copy of referral.  > 25 min spent with her    Follow-up and Dispositions    · Return in about 6 weeks (around 2/19/2020), or if symptoms worsen or fail to improve, for routine follow up.

## 2020-01-08 NOTE — PROGRESS NOTES
Chief Complaint   Patient presents with    Depression     follow up, alot going at home and her anxiety and depression is better     I have reviewed the patient's medical history in detail and updated the computerized patient record. Health Maintenance reviewed. 1. Have you been to the ER, urgent care clinic since your last visit? Hospitalized since your last visit?no    2. Have you seen or consulted any other health care providers outside of the [address] [place] [address] since your last visit? Include any pap smears or colon screening. No      Encouraged pt to discuss pt's wishes with spouse/partner/family and bring them in the next appt to follow thru with the Advanced Directive    Fall Risk Assessment, last 12 mths 1/8/2020   Able to walk? Yes   Fall in past 12 months? No       3 most recent PHQ Screens 1/8/2020   Little interest or pleasure in doing things More than half the days   Feeling down, depressed, irritable, or hopeless More than half the days   Total Score PHQ 2 4   Trouble falling or staying asleep, or sleeping too much -   Feeling tired or having little energy -   Poor appetite, weight loss, or overeating -   Feeling bad about yourself - or that you are a failure or have let yourself or your family down -   Trouble concentrating on things such as school, work, reading, or watching TV -   Moving or speaking so slowly that other people could have noticed; or the opposite being so fidgety that others notice -   Thoughts of being better off dead, or hurting yourself in some way -   PHQ 9 Score -   How difficult have these problems made it for you to do your work, take care of your home and get along with others -       Abuse Screening Questionnaire 1/8/2020   Do you ever feel afraid of your partner? N   Are you in a relationship with someone who physically or mentally threatens you? N   Is it safe for you to go home?  Y       ADL Assessment 1/8/2020   Feeding yourself No Help Needed   Getting from bed to chair No Help Needed   Getting dressed No Help Needed   Bathing or showering No Help Needed   Walk across the room (includes cane/walker) No Help Needed   Using the telphone No Help Needed   Taking your medications No Help Needed   Preparing meals No Help Needed   Managing money (expenses/bills) No Help Needed   Moderately strenuous housework (laundry) No Help Needed   Shopping for personal items (toiletries/medicines) No Help Needed   Shopping for groceries No Help Needed   Driving No Help Needed   Climbing a flight of stairs No Help Needed   Getting to places beyond walking distances No Help Needed

## 2020-01-10 RX ORDER — CITALOPRAM 20 MG/1
20 TABLET, FILM COATED ORAL DAILY
Qty: 90 TAB | Refills: 1 | Status: SHIPPED | OUTPATIENT
Start: 2020-01-10 | End: 2020-03-18 | Stop reason: SDUPTHER

## 2020-02-19 ENCOUNTER — OFFICE VISIT (OUTPATIENT)
Dept: INTERNAL MEDICINE CLINIC | Age: 25
End: 2020-02-19

## 2020-02-19 VITALS
OXYGEN SATURATION: 99 % | WEIGHT: 105 LBS | RESPIRATION RATE: 16 BRPM | HEIGHT: 65 IN | HEART RATE: 82 BPM | TEMPERATURE: 98.1 F | DIASTOLIC BLOOD PRESSURE: 75 MMHG | BODY MASS INDEX: 17.49 KG/M2 | SYSTOLIC BLOOD PRESSURE: 115 MMHG

## 2020-02-19 DIAGNOSIS — R30.0 DYSURIA: Primary | ICD-10-CM

## 2020-02-19 DIAGNOSIS — J45.20 MILD INTERMITTENT ASTHMA WITHOUT COMPLICATION: ICD-10-CM

## 2020-02-19 DIAGNOSIS — F32.0 CURRENT MILD EPISODE OF MAJOR DEPRESSIVE DISORDER, UNSPECIFIED WHETHER RECURRENT (HCC): ICD-10-CM

## 2020-02-19 DIAGNOSIS — N39.0 URINARY TRACT INFECTION WITHOUT HEMATURIA, SITE UNSPECIFIED: ICD-10-CM

## 2020-02-19 PROBLEM — F32.9 MAJOR DEPRESSIVE DISORDER: Status: ACTIVE | Noted: 2020-02-19

## 2020-02-19 LAB
BILIRUB UR QL STRIP: NORMAL
GLUCOSE UR-MCNC: NEGATIVE MG/DL
KETONES P FAST UR STRIP-MCNC: NEGATIVE MG/DL
PH UR STRIP: 5.5 [PH] (ref 4.6–8)
PROT UR QL STRIP: NORMAL
SP GR UR STRIP: 1.02 (ref 1–1.03)
UA UROBILINOGEN AMB POC: NORMAL (ref 0.2–1)
URINALYSIS CLARITY POC: NORMAL
URINALYSIS COLOR POC: NORMAL
URINE BLOOD POC: NORMAL
URINE LEUKOCYTES POC: NORMAL
URINE NITRITES POC: POSITIVE

## 2020-02-19 RX ORDER — NITROFURANTOIN 25; 75 MG/1; MG/1
100 CAPSULE ORAL 2 TIMES DAILY
Qty: 14 CAP | Refills: 0 | Status: SHIPPED | OUTPATIENT
Start: 2020-02-19 | End: 2020-02-21 | Stop reason: ALTCHOICE

## 2020-02-19 NOTE — PROGRESS NOTES
Chief Complaint   Patient presents with   Juan RamonTelma Parkerlandry 24     Urgent Care x2 times for a UTI and allergic reaction to antib. Now pt has low back pain     I have reviewed the patient's medical history in detail and updated the computerized patient record. Health Maintenance reviewed. 1. Have you been to the ER, urgent care clinic since your last visit? Hospitalized since your last visit? yes    2. Have you seen or consulted any other health care providers outside of the [address] [place] [address] since your last visit? Include any pap smears or colon screening. Urgent Care      Encouraged pt to discuss pt's wishes with spouse/partner/family and bring them in the next appt to follow thru with the Advanced Directive    Fall Risk Assessment, last 12 mths 1/8/2020   Able to walk? Yes   Fall in past 12 months? No       3 most recent PHQ Screens 1/8/2020   Little interest or pleasure in doing things More than half the days   Feeling down, depressed, irritable, or hopeless More than half the days   Total Score PHQ 2 4   Trouble falling or staying asleep, or sleeping too much More than half the days   Feeling tired or having little energy Nearly every day   Poor appetite, weight loss, or overeating Nearly every day   Feeling bad about yourself - or that you are a failure or have let yourself or your family down Nearly every day   Trouble concentrating on things such as school, work, reading, or watching TV More than half the days   Moving or speaking so slowly that other people could have noticed; or the opposite being so fidgety that others notice Not at all   Thoughts of being better off dead, or hurting yourself in some way Several days   PHQ 9 Score 18   How difficult have these problems made it for you to do your work, take care of your home and get along with others Very difficult       Abuse Screening Questionnaire 1/8/2020   Do you ever feel afraid of your partner?  N   Are you in a relationship with someone who physically or mentally threatens you? N   Is it safe for you to go home?  Y       ADL Assessment 1/8/2020   Feeding yourself No Help Needed   Getting from bed to chair No Help Needed   Getting dressed No Help Needed   Bathing or showering No Help Needed   Walk across the room (includes cane/walker) No Help Needed   Using the telphone No Help Needed   Taking your medications No Help Needed   Preparing meals No Help Needed   Managing money (expenses/bills) No Help Needed   Moderately strenuous housework (laundry) No Help Needed   Shopping for personal items (toiletries/medicines) No Help Needed   Shopping for groceries No Help Needed   Driving No Help Needed   Climbing a flight of stairs No Help Needed   Getting to places beyond walking distances No Help Needed

## 2020-02-21 ENCOUNTER — TELEPHONE (OUTPATIENT)
Dept: INTERNAL MEDICINE CLINIC | Age: 25
End: 2020-02-21

## 2020-02-21 LAB — BACTERIA UR CULT: ABNORMAL

## 2020-02-21 RX ORDER — CIPROFLOXACIN 500 MG/1
500 TABLET ORAL 2 TIMES DAILY
Qty: 10 TAB | Refills: 0 | Status: SHIPPED | OUTPATIENT
Start: 2020-02-21 | End: 2020-02-26

## 2020-03-18 ENCOUNTER — OFFICE VISIT (OUTPATIENT)
Dept: INTERNAL MEDICINE CLINIC | Age: 25
End: 2020-03-18

## 2020-03-18 VITALS
HEIGHT: 65 IN | HEART RATE: 68 BPM | RESPIRATION RATE: 16 BRPM | SYSTOLIC BLOOD PRESSURE: 113 MMHG | BODY MASS INDEX: 17.99 KG/M2 | DIASTOLIC BLOOD PRESSURE: 77 MMHG | OXYGEN SATURATION: 98 % | TEMPERATURE: 97.5 F | WEIGHT: 108 LBS

## 2020-03-18 DIAGNOSIS — F32.2 CURRENT SEVERE EPISODE OF MAJOR DEPRESSIVE DISORDER WITHOUT PSYCHOTIC FEATURES WITHOUT PRIOR EPISODE (HCC): ICD-10-CM

## 2020-03-18 DIAGNOSIS — F41.0 PANIC DISORDER: ICD-10-CM

## 2020-03-18 RX ORDER — CITALOPRAM 20 MG/1
20 TABLET, FILM COATED ORAL DAILY
Qty: 90 TAB | Refills: 1 | Status: SHIPPED | OUTPATIENT
Start: 2020-03-18 | End: 2020-06-18

## 2020-03-18 NOTE — PROGRESS NOTES
Chief Complaint   Patient presents with    Depression     med evaluation     I have reviewed the patient's medical history in detail and updated the computerized patient record. Health Maintenance reviewed. 1. Have you been to the ER, urgent care clinic since your last visit? Hospitalized since your last visit? yes    2. Have you seen or consulted any other health care providers outside of the [address] [place] [address] since your last visit? Include any pap smears or colon screening. Urgent Care      Encouraged pt to discuss pt's wishes with spouse/partner/family and bring them in the next appt to follow thru with the Advanced Directive    Fall Risk Assessment, last 12 mths 1/8/2020   Able to walk? Yes   Fall in past 12 months? No       3 most recent PHQ Screens 1/8/2020   Little interest or pleasure in doing things More than half the days   Feeling down, depressed, irritable, or hopeless More than half the days   Total Score PHQ 2 4   Trouble falling or staying asleep, or sleeping too much More than half the days   Feeling tired or having little energy Nearly every day   Poor appetite, weight loss, or overeating Nearly every day   Feeling bad about yourself - or that you are a failure or have let yourself or your family down Nearly every day   Trouble concentrating on things such as school, work, reading, or watching TV More than half the days   Moving or speaking so slowly that other people could have noticed; or the opposite being so fidgety that others notice Not at all   Thoughts of being better off dead, or hurting yourself in some way Several days   PHQ 9 Score 18   How difficult have these problems made it for you to do your work, take care of your home and get along with others Very difficult       Abuse Screening Questionnaire 1/8/2020   Do you ever feel afraid of your partner? N   Are you in a relationship with someone who physically or mentally threatens you? N   Is it safe for you to go home?  Maritza Fields ADL Assessment 1/8/2020   Feeding yourself No Help Needed   Getting from bed to chair No Help Needed   Getting dressed No Help Needed   Bathing or showering No Help Needed   Walk across the room (includes cane/walker) No Help Needed   Using the telphone No Help Needed   Taking your medications No Help Needed   Preparing meals No Help Needed   Managing money (expenses/bills) No Help Needed   Moderately strenuous housework (laundry) No Help Needed   Shopping for personal items (toiletries/medicines) No Help Needed   Shopping for groceries No Help Needed   Driving No Help Needed   Climbing a flight of stairs No Help Needed   Getting to places beyond walking distances No Help Needed

## 2020-03-18 NOTE — PROGRESS NOTES
Before patient entered building she was screened - no fever - denies symptoms of cough, congestion, cold, no diarrhea within past 14 days - no travel and no known contact  Ashlie De Luna LPN  0/[id number]  6:53 PM

## 2020-03-18 NOTE — PROGRESS NOTES
PROGRESS NOTE        SUBJECTIVE:  Diagnosis/Chief Complaint: Depression (med evaluation)     kicked out of house. In laws help. Doing well with mood yes - better  Symptoms see phq9  Suicidal: no  Side affects: no  States taking medications per medicine list.yes - as rx    Patient Active Problem List    Diagnosis Date Noted    Major depressive disorder 02/19/2020    Mild intermittent asthma 07/16/2019     Current Outpatient Medications   Medication Sig Dispense Refill    citalopram (CELEXA) 20 mg tablet Take 1 Tab by mouth daily. 90 Tab 1    albuterol (PROVENTIL HFA, VENTOLIN HFA, PROAIR HFA) 90 mcg/actuation inhaler Take 1 Puff by inhalation every six (6) hours as needed for Wheezing. Indications: Asthma Attack 1 Inhaler 2    norgestimate-ethinyl estradiol (ORTHO-CYCLEN, SPRINTEC) 0.25-35 mg-mcg tab Take 1 Tab by mouth daily. 1 Package 11     Allergies   Allergen Reactions    Amoxicillin Rash     Rash, itching    Bactrim [Sulfamethoprim] Rash    Cefzil [Cefprozil] Rash     Rash, itching    Pcn [Penicillins] Rash     Rash, itching     Social History     Tobacco Use    Smoking status: Former Smoker    Smokeless tobacco: Never Used   Substance Use Topics    Alcohol use: Not Currently        OBJECTIVE:    .  Visit Vitals  /77 (BP 1 Location: Left arm, BP Patient Position: Sitting)   Pulse 68   Temp 97.5 °F (36.4 °C) (Oral)   Resp 16   Ht 5' 4.75\" (1.645 m)   Wt 108 lb (49 kg)   SpO2 98%   BMI 18.11 kg/m²     WDWN in NAD  Heart RRR, no:C/M/R  Lungs CTA No wheezes, rales or rhonchi  Abdo: soft no tenderness, rebound or guarding  Neurological exam[de-identified] 2-12 intact  Psychiatric: Normal mood, judgement    Reviewed: Medications, allergies, clinical lab test results and imaging results have been reviewed. Any abnormal findings have been addressed. ASSESSMENT:       ICD-10-CM ICD-9-CM    1.  Current severe episode of major depressive disorder without psychotic features without [facility] F32.2 296.23 citalopram (CELEXA) 20 mg tablet   2. Panic disorder F41.0 300.01 citalopram (CELEXA) 20 mg tablet       PLAN    Orders Placed This Encounter    citalopram (CELEXA) 20 mg tablet     Sig: Take 1 Tab by mouth daily. Dispense:  90 Tab     Refill:  1     Depression doing better cont meds. Follow-up and Dispositions    · Return in about 3 months (around 6/18/2020) for routine follow up.

## 2020-06-18 ENCOUNTER — OFFICE VISIT (OUTPATIENT)
Dept: INTERNAL MEDICINE CLINIC | Age: 25
End: 2020-06-18

## 2020-06-18 VITALS
TEMPERATURE: 98.1 F | WEIGHT: 111 LBS | HEART RATE: 84 BPM | OXYGEN SATURATION: 99 % | DIASTOLIC BLOOD PRESSURE: 82 MMHG | RESPIRATION RATE: 16 BRPM | BODY MASS INDEX: 18.49 KG/M2 | SYSTOLIC BLOOD PRESSURE: 125 MMHG | HEIGHT: 65 IN

## 2020-06-18 DIAGNOSIS — F32.2 CURRENT SEVERE EPISODE OF MAJOR DEPRESSIVE DISORDER WITHOUT PSYCHOTIC FEATURES WITHOUT PRIOR EPISODE (HCC): ICD-10-CM

## 2020-06-18 DIAGNOSIS — F41.0 PANIC DISORDER: ICD-10-CM

## 2020-06-18 DIAGNOSIS — F33.0 DEPRESSION, MAJOR, RECURRENT, MILD (HCC): ICD-10-CM

## 2020-06-18 DIAGNOSIS — F41.9 ANXIETY: Primary | ICD-10-CM

## 2020-06-18 DIAGNOSIS — D50.9 IRON DEFICIENCY ANEMIA, UNSPECIFIED IRON DEFICIENCY ANEMIA TYPE: ICD-10-CM

## 2020-06-18 DIAGNOSIS — E53.8 VITAMIN B12 DEFICIENCY: ICD-10-CM

## 2020-06-18 DIAGNOSIS — Z02.0 SCHOOL PHYSICAL EXAM: ICD-10-CM

## 2020-06-18 RX ORDER — CITALOPRAM 40 MG/1
40 TABLET, FILM COATED ORAL DAILY
Qty: 90 TAB | Refills: 2 | Status: SHIPPED | OUTPATIENT
Start: 2020-06-18

## 2020-06-18 NOTE — PROGRESS NOTES
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 year old female who presents for completion of school physical form. Been in school and took prerequisites for a year. 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 HPI  Has 3 children and lives with in-laws. Went back to work at [employer]. Applied to nursing school and got accepted to [employer] in the fall. Pt stated she found out her  was unfaithful and got kicked out of the house from her Father-in-law. Applied for  to enroll her children. Stated it is \"hard to sleep and  focus. \" Emotional support given. Denied any palpitations but has occasional panic attacks. Pt reported she tried to go to counseling but stated \" it did not help. \"    Hx depression and anxiety   Pt noted she feels like Citalopram 20mg is helping and her symptoms have improved. Discussed increasing her dose and she Is willing to do that. Pt reported that she does not exercise but suggested that may help release some stress? Depression is better but still having issues. PHQ9=12  Improved from score 18 on 3/18/20 with Dr. Blessing Degroot. Complained of intermittent anxiety increasing the past 4 months. Stated she has had it \" all of her life. \"  Is overwhelmed and stressed from personal and life stressors. Rest and quiet helps. Hx asthma- Denied frequent use of her Albuterol inhaler. Complained of fullness in her posterior left knee, and stated she has had a Bakers Cyst since she was 17years old. Stated her knee \" goes out\" at times and wanted to know what to do? Explained we could do an XRAY and send her to ortho but not a lot of tx for that kind of cyst.  Advised pt she could wear a brace to see if that helps and follow up next week.      Currently uses Norgestimate-Ethinyl Estradiol 0.25-0.35mg-mcg tab daily. Smoking 2-3 cigs day. Informed pt that smoking and birth control can cause blood clots, heart attack or a stroke. Advised to check with GYN about Nuvaring or other BC options. Best option would be to quit smoking to prevent any future cardiac events. Discussed getting updated labs to rule out any metabolic causes. Pt in agreement. Reviewed PmHx, RxHx, FmHx, SocHx, AllgHx and updated in chart. Patient Active Problem List   Diagnosis Code    Mild intermittent asthma J45.20    Major depressive disorder F32.9     Review of Systems:  Gen: no fatigue, fever, chills  Eyes: no excessive tearing, itching, or discharge  Nose: no rhinorrhea, no sinus pain  Mouth: no oral lesions, no sore throat  Resp: no shortness of breath, no wheezing, no cough; occasional tightness in chest  CV: no chest pain, no paroxysmal nocturnal dyspnea  Abd: no nausea, no heartburn, no diarrhea, no constipation, no abdominal pain  Neuro: no headaches, no syncope or presyncopal episodes  Endo: no polyuria, no polydipsia  Heme: no lymphadenopathy, no easy bruising or bleeding    Allergies   Allergen Reactions    Amoxicillin Rash     Rash, itching    Bactrim [Sulfamethoprim] Rash    Cefzil [Cefprozil] Rash     Rash, itching    Pcn [Penicillins] Rash     Rash, itching     Current Outpatient Medications   Medication Sig    citalopram (CELEXA) 20 mg tablet Take 1 Tab by mouth daily.  norgestimate-ethinyl estradiol (ORTHO-CYCLEN, SPRINTEC) 0.25-35 mg-mcg tab Take 1 Tab by mouth daily.  albuterol (PROVENTIL HFA, VENTOLIN HFA, PROAIR HFA) 90 mcg/actuation inhaler Take 1 Puff by inhalation every six (6) hours as needed for Wheezing. Indications: Asthma Attack     No current facility-administered medications for this visit.       Objective:     Visit Vitals  /82 (BP 1 Location: Left arm, BP Patient Position: At rest)   Pulse 84   Temp 98.1 °F (36.7 °C) (Oral)   Resp 16   Ht 5' 4.75\" (1.645 m) Wt 111 lb (50.3 kg)   SpO2 99%   BMI 18.61 kg/m²      Physical Examination:   Gen: alert, oriented, no acute distress  Head: normocephalic, atraumatic  Ears: external auditory canals clear, TMs without erythema or effusion  Eyes: pupils equal round reactive to light, sclera clear, conjunctiva clear  Nose: normal turbinates, no rhinorrhea  Oral: moist mucus membranes, no oral lesions, no pharyngeal inflammation or exudate  Neck: supple, mild cervical lymphadenopathy bilateral  Resp: no increased work of breathing, lungs clear to ausculation bilaterally  CV: S1, S2 normal, no murmurs, rubs, or gallops. Abd: soft, not tender, not distended. No hepatosplenomegaly. Normal bowel sounds. No hernias. Neuro: cranial nerves intact, normal strength and movement in all extremities, reflexes and sensation intact and symmetric. Skin: no lesion or rash  Musc: Left posterior knee slight edema noted. Flexion and extension WNL. No instability noted. Assessment/ Plan:       ICD-10-CM ICD-9-CM    1. Anxiety V60.7 304.79 METABOLIC PANEL, COMPREHENSIVE      CBC WITH AUTOMATED DIFF      MAGNESIUM      TSH 3RD GENERATION      T4, FREE   2. Vitamin B12 deficiency E53.8 266.2 VITAMIN B12      VITAMIN D, 25 HYDROXY   3. Iron deficiency anemia, unspecified iron deficiency anemia type D50.9 280.9 IRON PROFILE   4. Depression, major, recurrent, mild (HCC) F33.0 296.31    5. Current severe episode of major depressive disorder without psychotic features without prior episode ([facility]) F32.2 296.23 citalopram (CELEXA) 40 mg tablet   6. Panic disorder F41.0 300.01 citalopram (CELEXA) 40 mg tablet   7. School physical exam Z02.0 V70.5 HBV CORE AB, IGG/IGM      VZV AB, IGG      QUANTIFERON-TB GOLD PLUS   1. Anxiety  Increased Citalopram to 40mg daily. Labs pending  - METABOLIC PANEL, COMPREHENSIVE; Future  - CBC WITH AUTOMATED DIFF; Future  - MAGNESIUM; Future  - TSH 3RD GENERATION; Future  - T4, FREE; Future    2.  Vitamin B12 deficiency  Pending  - VITAMIN B12; Future  - VITAMIN D, 25 HYDROXY; Future    3. Iron deficiency anemia, unspecified iron deficiency anemia type  Pending  - IRON PROFILE; Future    4. Depression, major, recurrent, mild (HCC)  Increased Citalopram to 40mg     5. Current severe episode of major depressive disorder without psychotic features without prior episode ([facility])  Increased  - citalopram (CELEXA) 40 mg tablet; Take 1 Tab by mouth daily. Dispense: 90 Tab; Refill: 2    6. Panic disorder  Increased dose. - citalopram (CELEXA) 40 mg tablet; Take 1 Tab by mouth daily. Dispense: 90 Tab; Refill: 2    7. School physical exam  Pending.   - HBV CORE AB, IGG/IGM; Future  - VZV AB, IGG; Future  - QUANTIFERON-TB GOLD PLUS; Future    Increased Citalopram to 40mg daily. Increased dosages may increase suicidal ideations. If adverse effects occur stop medication and notify provider immediately. Advised pt to check with GYN as to alternative for birth control ( Nuvaring?)  Best option would be to quit smoking. Suggested follow up in 1 week to discuss labs results, titers, and complete school form. I have discussed the diagnosis with the patient and the intended plan as seen in the above orders. The patient has received an after-visit summary and questions were answered concerning future plans. If symptoms worsen, go to the ER. Medication Side Effects and Warnings were discussed with patient: yes  Patient Labs were reviewed: no- ordered new labs. Patient Past Records were reviewed:  No- need records. If symptoms worsen and absolutely necessary, call 911 or go to nearest ER.      Vernell Nickerson NP

## 2020-06-18 NOTE — PROGRESS NOTES
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 Yes   Fall in past 12 months? No       3 most recent PHQ Screens 6/18/2020   Little interest or pleasure in doing things Not at all   Feeling down, depressed, irritable, or hopeless Not at all   Total Score PHQ 2 0   Trouble falling or staying asleep, or sleeping too much -   Feeling tired or having little energy -   Poor appetite, weight loss, or overeating -   Feeling bad about yourself - or that you are a failure or have let yourself or your family down -   Trouble concentrating on things such as school, work, reading, or watching TV -   Moving or speaking so slowly that other people could have noticed; or the opposite being so fidgety that others notice -   Thoughts of being better off dead, or hurting yourself in some way -   PHQ 9 Score -   How difficult have these problems made it for you to do your work, take care of your home and get along with others -       Abuse Screening Questionnaire 6/18/2020   Do you ever feel afraid of your partner? N   Are you in a relationship with someone who physically or mentally threatens you? N   Is it safe for you to go home?  Y       ADL Assessment 6/18/2020   Feeding yourself No Help Needed   Getting from bed to chair No Help Needed   Getting dressed No Help Needed   Bathing or showering No Help Needed   Walk across the room (includes cane/walker) No Help Needed   Using the telphone No Help Needed   Taking your medications No Help Needed   Preparing meals No Help Needed   Managing money (expenses/bills) No Help Needed   Moderately strenuous housework (laundry) No Help Needed   Shopping for personal items (toiletries/medicines) No Help Needed   Shopping for groceries No Help Needed   Driving No Help Needed   Climbing a flight of stairs No Help Needed Getting to places beyond walking distances No Help Needed

## 2020-06-18 NOTE — PATIENT INSTRUCTIONS

## 2020-06-26 ENCOUNTER — VIRTUAL VISIT (OUTPATIENT)
Dept: INTERNAL MEDICINE CLINIC | Age: 25
End: 2020-06-26

## 2020-06-26 DIAGNOSIS — F41.9 ANXIETY: ICD-10-CM

## 2020-06-26 DIAGNOSIS — Z23 ENCOUNTER FOR IMMUNIZATION: Primary | ICD-10-CM

## 2020-06-26 DIAGNOSIS — F32.0 CURRENT MILD EPISODE OF MAJOR DEPRESSIVE DISORDER, UNSPECIFIED WHETHER RECURRENT (HCC): ICD-10-CM

## 2020-06-26 LAB
25(OH)D3+25(OH)D2 SERPL-MCNC: 49.6 NG/ML (ref 30–100)
ALBUMIN SERPL-MCNC: 4.5 G/DL (ref 3.9–5)
ALBUMIN/GLOB SERPL: 2 {RATIO} (ref 1.2–2.2)
ALP SERPL-CCNC: 44 IU/L (ref 39–117)
ALT SERPL-CCNC: 10 IU/L (ref 0–32)
AST SERPL-CCNC: 15 IU/L (ref 0–40)
BASOPHILS # BLD AUTO: 0.1 X10E3/UL (ref 0–0.2)
BASOPHILS NFR BLD AUTO: 1 %
BILIRUB SERPL-MCNC: 0.6 MG/DL (ref 0–1.2)
BUN SERPL-MCNC: 10 MG/DL (ref 6–20)
BUN/CREAT SERPL: 13 (ref 9–23)
CALCIUM SERPL-MCNC: 9.3 MG/DL (ref 8.7–10.2)
CHLORIDE SERPL-SCNC: 103 MMOL/L (ref 96–106)
CO2 SERPL-SCNC: 24 MMOL/L (ref 20–29)
CREAT SERPL-MCNC: 0.76 MG/DL (ref 0.57–1)
EOSINOPHIL # BLD AUTO: 0.3 X10E3/UL (ref 0–0.4)
EOSINOPHIL NFR BLD AUTO: 5 %
ERYTHROCYTE [DISTWIDTH] IN BLOOD BY AUTOMATED COUNT: 11.9 % (ref 11.7–15.4)
GAMMA INTERFERON BACKGROUND BLD IA-ACNC: 0.04 IU/ML
GLOBULIN SER CALC-MCNC: 2.2 G/DL (ref 1.5–4.5)
GLUCOSE SERPL-MCNC: 74 MG/DL (ref 65–99)
HBV CORE AB SERPL QL IA: NEGATIVE
HBV CORE IGM SERPL QL IA: NEGATIVE
HCT VFR BLD AUTO: 42.1 % (ref 34–46.6)
HGB BLD-MCNC: 14.2 G/DL (ref 11.1–15.9)
IMM GRANULOCYTES # BLD AUTO: 0 X10E3/UL (ref 0–0.1)
IMM GRANULOCYTES NFR BLD AUTO: 0 %
IRON SATN MFR SERPL: 46 % (ref 15–55)
IRON SERPL-MCNC: 166 UG/DL (ref 27–159)
LYMPHOCYTES # BLD AUTO: 2.3 X10E3/UL (ref 0.7–3.1)
LYMPHOCYTES NFR BLD AUTO: 36 %
M TB IFN-G BLD-IMP: NEGATIVE
M TB IFN-G CD4+ BCKGRND COR BLD-ACNC: 0.06 IU/ML
MAGNESIUM SERPL-MCNC: 2.2 MG/DL (ref 1.6–2.3)
MCH RBC QN AUTO: 30 PG (ref 26.6–33)
MCHC RBC AUTO-ENTMCNC: 33.7 G/DL (ref 31.5–35.7)
MCV RBC AUTO: 89 FL (ref 79–97)
MITOGEN IGNF BLD-ACNC: >10 IU/ML
MONOCYTES # BLD AUTO: 0.4 X10E3/UL (ref 0.1–0.9)
MONOCYTES NFR BLD AUTO: 6 %
NEUTROPHILS # BLD AUTO: 3.4 X10E3/UL (ref 1.4–7)
NEUTROPHILS NFR BLD AUTO: 52 %
PLATELET # BLD AUTO: 262 X10E3/UL (ref 150–450)
POTASSIUM SERPL-SCNC: 4.4 MMOL/L (ref 3.5–5.2)
PROT SERPL-MCNC: 6.7 G/DL (ref 6–8.5)
QUANTIFERON INCUBATION, QF1T: NORMAL
QUANTIFERON TB2 AG: 0.04 IU/ML
RBC # BLD AUTO: 4.74 X10E6/UL (ref 3.77–5.28)
SERVICE CMNT-IMP: NORMAL
SODIUM SERPL-SCNC: 139 MMOL/L (ref 134–144)
T4 FREE SERPL-MCNC: 1.28 NG/DL (ref 0.82–1.77)
TIBC SERPL-MCNC: 363 UG/DL (ref 250–450)
TSH SERPL DL<=0.005 MIU/L-ACNC: 1.65 UIU/ML (ref 0.45–4.5)
UIBC SERPL-MCNC: 197 UG/DL (ref 131–425)
VIT B12 SERPL-MCNC: 473 PG/ML (ref 232–1245)
VZV IGG SER IA-ACNC: 1698 INDEX
WBC # BLD AUTO: 6.5 X10E3/UL (ref 3.4–10.8)

## 2020-06-26 RX ORDER — BUSPIRONE HYDROCHLORIDE 10 MG/1
10 TABLET ORAL 3 TIMES DAILY
Qty: 90 TAB | Refills: 0 | Status: SHIPPED | OUTPATIENT
Start: 2020-06-26 | End: 2020-10-06

## 2020-06-26 NOTE — PROGRESS NOTES
Lisy Shah is a 27 y.o. female who was seen by synchronous (real-time) audio-video technology on 6/26/2020. Consent: Lisy Shah, who was seen by synchronous (real-time) audio-video technology, and/or her healthcare decision maker, is aware that this patient-initiated, Telehealth encounter on 6/26/2020 is a billable service, with coverage as determined by her insurance carrier. She is aware that she may receive a bill and has provided verbal consent to proceed: Yes. I spent at least 20 minutes on this visit with this established patient. Subjective:   Lisy Shah is a 27 y.o. 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 Having random panic attacks. Under a lot of stress and in midst of custody prasad. Discussed starting Buspar and taking it TID along with psychiatrist consult. Stated she tried counseling in the past and it \" did not help. \"    Reviewed labs. All within normal limits. Discussed with pt about Hepatitis B immunity. Informed pt that even though she got the Hepatitis B vaccines-3 series in the past she does not have the antibodies. Will need to get Hep B 3 injection vaccine series again. Ordered one today, then will get one in 1 month and then the last one in 6 months. Repeat antibody test to be done after 2nd series completed. Discussed pt will need to have them [facility]. Verbalized understanding. Prior to Admission medications    Medication Sig Start Date End Date Taking? Authorizing Provider   citalopram (CELEXA) 40 mg tablet Take 1 Tab by mouth daily. 6/18/20  Yes Johnna Martinez NP   albuterol (PROVENTIL HFA, VENTOLIN HFA, PROAIR HFA) 90 mcg/actuation inhaler Take 1 Puff by inhalation every six (6) hours as needed for Wheezing.  Indications: Asthma Attack 7/16/19  Yes Marge Carpenter MD   norgestimate-ethinyl estradiol (ORTHO-CYCLEN, [facility]) 0.25-35 mg-mcg tab Take 1 Tab by mouth daily. 7/16/19  Yes Clarissa Wilson MD     Allergies   Allergen Reactions    Amoxicillin Rash     Rash, itching    Bactrim [Sulfamethoprim] Rash    Cefzil [Cefprozil] Rash     Rash, itching    Pcn [Penicillins] Rash     Rash, itching     Past Medical History:   Diagnosis Date    Asthma      No past surgical history on file. No family history on file. Social History     Tobacco Use    Smoking status: Former Smoker    Smokeless tobacco: Never Used   Substance Use Topics    Alcohol use: Not Currently     Review of Systems   Respiratory: Negative for shortness of breath. Cardiovascular: Negative for palpitations. Psychiatric/Behavioral: Negative for suicidal ideas. The patient is nervous/anxious and has insomnia. Objective:   Vital Signs: (As obtained by patient/caregiver at home)  There were no vitals taken for this visit.      Constitutional: [x] Appears well-developed and well-nourished [x] No apparent distress      [] Abnormal -     Mental status: [x] Alert and awake  [x] Oriented to person/place/time [x] Able to follow commands    [] Abnormal -     Eyes:   EOM    []  Normal    [] Abnormal -   Sclera  [x]  Normal    [] Abnormal -          Discharge [x]  None visible   [] Abnormal -     HENT: [x] Normocephalic, atraumatic  [] Abnormal -   [x] Mouth/Throat: Mucous membranes are moist    External Ears [x] Normal  [] Abnormal -    Neck: [x] No visualized mass [] Abnormal -     Pulmonary/Chest: [x] Respiratory effort normal   [x] No visualized signs of difficulty breathing or respiratory distress        [] Abnormal -      Musculoskeletal:   [] Normal gait with no signs of ataxia         [x] Normal range of motion of neck        [] Abnormal -     Neurological:        [x] No Facial Asymmetry (Cranial nerve 7 motor function) (limited exam due to video visit)          [x] No gaze palsy        [] Abnormal -          Skin:        [x] No significant exanthematous lesions or discoloration noted on facial skin         [] Abnormal -            Psychiatric:       [x] Normal Affect [] Abnormal -        [x] No Hallucinations    Assessment & Plan:   The complexity of medical decision making for this visit is moderate     ICD-10-CM ICD-9-CM    1. Encounter for immunization Z23 V03.89 HEPATITIS B VACCINE ADOLES IM   2. Anxiety F41.9 300.00 REFERRAL TO PSYCHIATRY      busPIRone (BUSPAR) 10 mg tablet   3. Current mild episode of major depressive disorder, unspecified whether recurrent ([facility] F32.0 296.21 REFERRAL TO PSYCHIATRY     1. Encounter for immunization  Needs repeat Hepatitis B series due to not having the antibodies. Advised to get at the [facility]. - HEPATITIS B VACCINE ADOLES IM; Future    2. Anxiety  Call and make an apt. Take Buspar for anxiety TID.   - REFERRAL TO PSYCHIATRY  - busPIRone (BUSPAR) 10 mg tablet; Take 1 Tab by mouth three (3) times daily. Dispense: 90 Tab; Refill: 0    3. Current mild episode of major depressive disorder, unspecified whether recurrent (HCC)  Continue Citalopram 40mg daily.   - REFERRAL TO PSYCHIATRY    Suggested patient get repeat hepatitis B immunizations at the Health [facility]. Advised patient to use BuSpar up to 3 times a day as needed for anxiety and panic attacks. Advised patient to call psychiatrist and make an appointment. Patient was interested in the NuvaRing. Had never had a Pap before. Advised patient to call GYN and make an appointment. Follow up in 1 month. We discussed the expected course, resolution and complications of the diagnosis(es) in detail. Medication risks, benefits, costs, interactions, and alternatives were discussed as indicated. I advised her to contact the office if her condition worsens, changes or fails to improve as anticipated. She expressed understanding with the diagnosis(es) and plan.      Bushra Biswas is a 27 y.o. female who was evaluated by a video visit encounter for concerns as above. Patient identification was verified prior to start of the visit. A caregiver was present when appropriate. Due to this being a TeleHealth encounter (During HDPGK-58 public health emergency), evaluation of the following organ systems was limited: Vitals/Constitutional/EENT/Resp/CV/GI//MS/Neuro/Skin/Heme-Lymph-Imm. Pursuant to the emergency declaration under the [facility], [facility] waiver authority and the [employer] Act, this Virtual  Visit was conducted, with patient's (and/or legal guardian's) consent, to reduce the patient's risk of exposure to COVID-19 and provide necessary medical care. Services were provided through a video synchronous discussion virtually to substitute for in-person clinic visit. Patient and provider were located at their individual homes. If symptoms worsen and absolutely necessary, call 911 or go to nearest ER.        Dino Libman, NP none

## 2020-06-26 NOTE — PROGRESS NOTES
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

## 2020-07-16 NOTE — TELEPHONE ENCOUNTER
Requested Prescriptions     Pending Prescriptions Disp Refills    norgestimate-ethinyl estradioL (ORTHO-CYCLEN, [facility]) 0.25-35 mg-mcg tab 3 Package 1     Sig: Take 1 Tab by mouth daily.      Last office visit 6/26/20  Last filled  7/16/19  Changes made to medication on last visit  none

## 2020-07-17 RX ORDER — NORGESTIMATE AND ETHINYL ESTRADIOL 0.25-0.035
1 KIT ORAL DAILY
Qty: 1 PACKAGE | Refills: 11 | Status: SHIPPED | OUTPATIENT
Start: 2020-07-17

## 2020-10-06 DIAGNOSIS — F41.9 ANXIETY: ICD-10-CM

## 2020-10-06 RX ORDER — BUSPIRONE HYDROCHLORIDE 10 MG/1
TABLET ORAL
Qty: 90 TAB | Refills: 0 | Status: SHIPPED | OUTPATIENT
Start: 2020-10-06 | End: 2020-11-27

## 2020-11-27 DIAGNOSIS — F41.9 ANXIETY: ICD-10-CM

## 2020-11-27 RX ORDER — BUSPIRONE HYDROCHLORIDE 10 MG/1
TABLET ORAL
Qty: 90 TAB | Refills: 0 | Status: SHIPPED | OUTPATIENT
Start: 2020-11-27

## 2021-01-04 ENCOUNTER — TELEPHONE (OUTPATIENT)
Dept: INTERNAL MEDICINE CLINIC | Age: 26
End: 2021-01-04

## 2021-01-04 NOTE — TELEPHONE ENCOUNTER
Scheduled patient for a virtual appt tomorrow morning [facility] Dr Charlene Pederson to discuss referral to ENT for tonsillitis  Renu Sims LPN  1/0/0512  6:21 PM

## 2021-01-04 NOTE — TELEPHONE ENCOUNTER
arabella has been seen again at the urgent care and dx with strep throat. The urgent care doctor advised her to contact Dr Delano Miranda to get a referral to a ENT for a consultation for a possible tonsillectomy.   She would like to be referred to the [place] [facility] in [place]

## 2021-01-05 ENCOUNTER — VIRTUAL VISIT (OUTPATIENT)
Dept: INTERNAL MEDICINE CLINIC | Age: 26
End: 2021-01-05
Payer: COMMERCIAL

## 2021-01-05 DIAGNOSIS — J02.9 PHARYNGITIS, UNSPECIFIED ETIOLOGY: Primary | ICD-10-CM

## 2021-01-05 PROCEDURE — 99213 OFFICE O/P EST LOW 20 MIN: CPT | Performed by: FAMILY MEDICINE

## 2021-01-05 NOTE — PROGRESS NOTES
HISTORY OF PRESENT ILLNESS  Aniya Perales is a 23 y.o. female. Sore Throat   The history is provided by the patient. This is a recurrent problem. Episode onset: OCT. The problem has not changed (3 visits to ) since onset. There has been no fever (first time only). She has had no exposure to strep or mono. Treatments tried: Ab steroids. The treatment provided mild relief. Urgent care suggested she get a referral to see ear nose and throat specialist.    Review of Systems   HENT: Positive for sore throat.       Allergies   Allergen Reactions    Amoxicillin Rash     Rash, itching    Bactrim [Sulfamethoprim] Rash    Cefzil [Cefprozil] Rash     Rash, itching    Pcn [Penicillins] Rash     Rash, itching     Social History     Socioeconomic History    Marital status: UNKNOWN     Spouse name: Not on file    Number of children: 4    Years of education: Not on file    Highest education level: Not on file   Occupational History    Not on file   Social Needs    Financial resource strain: Not on file    Food insecurity     Worry: Not on file     Inability: Not on file   [employer] needs     Medical: Not on file     Non-medical: Not on file   Tobacco Use    Smoking status: Former Smoker    Smokeless tobacco: Never Used   Substance and Sexual Activity    Alcohol use: Not Currently    Drug use: Never    Sexual activity: Yes     Partners: Male   Lifestyle    Physical activity     Days per week: Not on file     Minutes per session: Not on file    Stress: Very much   Relationships    Social connections     Talks on phone: Not on file     Gets together: Not on file     Attends Oriental orthodox service: Not on file     Active member of club or organization: Not on file     Attends meetings of clubs or organizations: Not on file     Relationship status: Not on file    Intimate partner violence     Fear of current or ex partner: Not on file     Emotionally abused: Not on file     Physically abused: Not on file Forced sexual activity: Not on file   Other Topics Concern    Not on file   Social History Narrative     is having relationships with other women. Physical Exam  Appears to be in no acute distress, grossly 2 through 12 are nonfocal.  Labs from today were reviewed  no, labs done previously were reviewed  yes, Labs done in ER were reviewed  yes, Additional labs are ordered  no,      ASSESSMENT and PLAN  Encounter Diagnoses   Name Primary?  Pharyngitis, unspecified etiology Yes     Recurrent  Discussed the nature of pharyngitis, indications for tonsillectomy, complications associated with this operation as well as how so far no data shows much improvement in recurrent pharyngeal infections. Discussed the natural history where these infections tend to decline as she gets older. She can call me if she desires referral but for now we will watch to see if these continue to occur.     Follow-up as needed

## 2021-01-05 NOTE — PROGRESS NOTES
Chief Complaint   Patient presents with    Sore Throat     Urgent Care FU needs a referral to ENT     Health Maintenance reviewed. I have reviewed the patient's medical history in detail and updated the computerized patient record. Patient has not been out of the country in (10-11 months), NO diarrhea, NO cough, NO chest conjestion, NO temp. Pt has not been around anyone with these symptoms. 1. Have you been to the ER, urgent care clinic since your last visit? Hospitalized since your last visit? yes    2. Have you seen or consulted any other health care providers outside of the [address] [place] [address] since your last visit? Include any pap smears or colon screening. Yes      Encouraged pt to discuss pt's wishes with spouse/partner/family and bring them in the next appt to follow thru with the Advanced Directive    Fall Risk Assessment, last 12 mths 1/5/2021   Able to walk? Yes   Fall in past 12 months? 0   Do you feel unsteady? 0   Are you worried about falling 0       3 most recent PHQ Screens 1/5/2021   Little interest or pleasure in doing things More than half the days   Feeling down, depressed, irritable, or hopeless More than half the days   Total Score PHQ 2 4   Trouble falling or staying asleep, or sleeping too much -   Feeling tired or having little energy -   Poor appetite, weight loss, or overeating -   Feeling bad about yourself - or that you are a failure or have let yourself or your family down -   Trouble concentrating on things such as school, work, reading, or watching TV -   Moving or speaking so slowly that other people could have noticed; or the opposite being so fidgety that others notice -   Thoughts of being better off dead, or hurting yourself in some way -   PHQ 9 Score -   How difficult have these problems made it for you to do your work, take care of your home and get along with others -       Abuse Screening Questionnaire 1/5/2021   Do you ever feel afraid of your partner?  Yanira Retana Are you in a relationship with someone who physically or mentally threatens you? N   Is it safe for you to go home?  Y       ADL Assessment 1/5/2021   Feeding yourself No Help Needed   Getting from bed to chair No Help Needed   Getting dressed No Help Needed   Bathing or showering No Help Needed   Walk across the room (includes cane/walker) No Help Needed   Using the telphone No Help Needed   Taking your medications No Help Needed   Preparing meals No Help Needed   Managing money (expenses/bills) No Help Needed   Moderately strenuous housework (laundry) No Help Needed   Shopping for personal items (toiletries/medicines) No Help Needed   Shopping for groceries No Help Needed   Driving No Help Needed   Climbing a flight of stairs No Help Needed   Getting to places beyond walking distances No Help Needed

## 2022-01-13 DIAGNOSIS — J45.20 MILD INTERMITTENT ASTHMA WITHOUT COMPLICATION: ICD-10-CM

## 2022-01-13 RX ORDER — ALBUTEROL SULFATE 90 UG/1
1 AEROSOL, METERED RESPIRATORY (INHALATION)
Qty: 3 EACH | Refills: 1 | Status: SHIPPED | OUTPATIENT
Start: 2022-01-13

## 2022-01-13 NOTE — TELEPHONE ENCOUNTER
----- Message from Barby Carvalho sent at 1/10/2022 12:44 PM EST -----  Subject: Message to Provider    QUESTIONS  Information for Provider? Patient is recovering from Covid, pos test   01/05. Getting better but know inhaler will help.  ---------------------------------------------------------------------------  --------------  CALL BACK INFO  What is the best way for the office to contact you? OK to leave message on   [employer], OK to respond with electronic message via [employer] portal (only   for patients who have registered [employer] account)  Preferred Call Back Phone Number? [phone]  ---------------------------------------------------------------------------  --------------  SCRIPT ANSWERS  Relationship to Patient?  Self

## 2022-03-19 PROBLEM — J45.20 MILD INTERMITTENT ASTHMA: Status: ACTIVE | Noted: 2019-07-16

## 2022-03-19 PROBLEM — F33.0 DEPRESSION, MAJOR, RECURRENT, MILD (HCC): Status: ACTIVE | Noted: 2020-06-18

## 2022-03-20 PROBLEM — F32.9 MAJOR DEPRESSIVE DISORDER: Status: ACTIVE | Noted: 2020-02-19

## 2023-05-22 RX ORDER — NORGESTIMATE AND ETHINYL ESTRADIOL 0.25-0.035
1 KIT ORAL DAILY
COMMUNITY
Start: 2020-07-17

## 2023-05-22 RX ORDER — BUSPIRONE HYDROCHLORIDE 10 MG/1
1 TABLET ORAL 3 TIMES DAILY
COMMUNITY
Start: 2020-11-27

## 2023-05-22 RX ORDER — CITALOPRAM 40 MG/1
40 TABLET ORAL DAILY
COMMUNITY
Start: 2020-06-18

## 2023-05-22 RX ORDER — ALBUTEROL SULFATE 90 UG/1
1 AEROSOL, METERED RESPIRATORY (INHALATION) EVERY 6 HOURS PRN
COMMUNITY
Start: 2022-01-13